# Patient Record
Sex: FEMALE | Race: BLACK OR AFRICAN AMERICAN | URBAN - METROPOLITAN AREA
[De-identification: names, ages, dates, MRNs, and addresses within clinical notes are randomized per-mention and may not be internally consistent; named-entity substitution may affect disease eponyms.]

---

## 2017-02-10 ENCOUNTER — OFFICE VISIT (OUTPATIENT)
Dept: SURGERY | Facility: CLINIC | Age: 67
End: 2017-02-10
Payer: COMMERCIAL

## 2017-02-10 VITALS
WEIGHT: 231 LBS | RESPIRATION RATE: 18 BRPM | TEMPERATURE: 98 F | HEART RATE: 100 BPM | BODY MASS INDEX: 39.44 KG/M2 | SYSTOLIC BLOOD PRESSURE: 135 MMHG | HEIGHT: 64 IN | OXYGEN SATURATION: 98 % | DIASTOLIC BLOOD PRESSURE: 69 MMHG

## 2017-02-10 DIAGNOSIS — Z13.21 SCREENING FOR ENDOCRINE, NUTRITIONAL, METABOLIC AND IMMUNITY DISORDER: ICD-10-CM

## 2017-02-10 DIAGNOSIS — G47.33 OSA (OBSTRUCTIVE SLEEP APNEA): ICD-10-CM

## 2017-02-10 DIAGNOSIS — Z13.228 SCREENING FOR ENDOCRINE, NUTRITIONAL, METABOLIC AND IMMUNITY DISORDER: ICD-10-CM

## 2017-02-10 DIAGNOSIS — E11.9 TYPE 2 DIABETES MELLITUS WITHOUT COMPLICATION, WITHOUT LONG-TERM CURRENT USE OF INSULIN (H): Primary | ICD-10-CM

## 2017-02-10 DIAGNOSIS — Z13.0 SCREENING FOR ENDOCRINE, NUTRITIONAL, METABOLIC AND IMMUNITY DISORDER: ICD-10-CM

## 2017-02-10 DIAGNOSIS — Z13.0 SCREENING FOR IRON DEFICIENCY ANEMIA: ICD-10-CM

## 2017-02-10 DIAGNOSIS — Z13.29 SCREENING FOR ENDOCRINE, NUTRITIONAL, METABOLIC AND IMMUNITY DISORDER: ICD-10-CM

## 2017-02-10 DIAGNOSIS — I10 ESSENTIAL HYPERTENSION: ICD-10-CM

## 2017-02-10 PROCEDURE — 97802 MEDICAL NUTRITION INDIV IN: CPT | Performed by: DIETITIAN, REGISTERED

## 2017-02-10 PROCEDURE — 99205 OFFICE O/P NEW HI 60 MIN: CPT | Performed by: PHYSICIAN ASSISTANT

## 2017-02-10 RX ORDER — ASPIRIN 325 MG
TABLET, DELAYED RELEASE (ENTERIC COATED) ORAL DAILY
COMMUNITY

## 2017-02-10 RX ORDER — ALBUTEROL SULFATE 0.83 MG/ML
1 SOLUTION RESPIRATORY (INHALATION) EVERY 6 HOURS PRN
COMMUNITY

## 2017-02-10 RX ORDER — TRIAMCINOLONE ACETONIDE 1 MG/G
CREAM TOPICAL 2 TIMES DAILY
COMMUNITY

## 2017-02-10 RX ORDER — BISMUTH SUBSALICYLATE 262MG/15ML
1 SUSPENSION, ORAL (FINAL DOSE FORM) ORAL
COMMUNITY

## 2017-02-10 NOTE — PROGRESS NOTES
Name: LINDA ARANA  : 1950  Gender: Female  MRN: [id number]  Age: 66  INITIAL BARIATRIC NUTRITION ASSESSMENT  REASON FOR VISIT:  LINDA ARANA is a 66 year old Female presents today for initial nutrition visit for bariatric surgery.  DIAGNOSIS:  Class II Obesity  ANTHROPOMETRICS:  Height: 64.1 inches  Weight: 231.0 lbs  BMI: 39.5 kg/m2  CURRENT SUPPLEMENTS:  Multivitamin/Mineral: multivitamin  WEIGHT LOSS ATTEMPTS:  Calorie Counting, Exercise, Weight Watchers, Nutri System  Prescription diet medications:  None  NUTRITION HISTORY:  Meals per day: 3  Snacking: No  Breakfast: protein, carb and dairy  Lunch: Protein and carb  Supper: protein, vegetable sometmes carb  Snacks: protein and/or carb  Drinks: coffee, tea, milk, water  Emotional eating: Yes  Binge eating: No  Night eating: Yes  Can you afford three meals per day? Yes  Can you afford the $50-60 per month for vitamins? Yes  Comments: Pt has been thinking about surgery for quite a few months; describes herself as very competitive - wants to lose 20 lbs pre-op, not 5.   DINING OUT HISTORY:  Frequency per week: Around once a week  Location: sit-down restaurants  Type of food: protein, carb, vegetable, soup, breakfast food   PHYSICAL ACTIVITY:  Type: Gym membership  Frequency: 3-4x/week  Duration (min): 60  NUTRITION DIAGNOSIS:  Patient with excessive oral food/beverage intake related to intake of calorically dense food/beverages at meals and/or snacks as evidenced by BMI of 39.5kg/m2.   INTERVENTION:  Nutrition Prescription: Recommend nutrient/ energy modification   Goals:  Begin taking multivitamin and supplements per guidelines   Eliminate snacking and be mindful of portions at mealtimes   Continue current exercise regimen   Practice  fluids and meals by 30 minuts  Implementation:  Provided written guidelines for Laparoscopic Gastric Sleeve surgery.  Provided weight loss suggestions.  Explained diet changes that would occur after  surgery.  Emphasized importance of diet and lifestyle modifications.  Discussed necessity for chewable multivitamin/ mineral supplements, calcium with vitamin D, vitamin B-12, vitamin D, Iron and vitamin C supplements.  NUTRITION MONITORING AND EVALUATION:  Verbalizes understanding of surgery diet guidelines.  Anticipated compliance: Good    FOLLOW UP: 1 month  Recommend 2 to 3 follow up visits to assist with lifestyle changes or per insurance requirements.  RD name and number provided.  TIME SPENT WITH PATIENT: 45 minutes  Deborah Herron RD, LD  Clinical Dietitian

## 2017-02-15 ENCOUNTER — TRANSFERRED RECORDS (OUTPATIENT)
Dept: HEALTH INFORMATION MANAGEMENT | Facility: CLINIC | Age: 67
End: 2017-02-15

## 2017-02-15 LAB — PHQ9 SCORE: 3

## 2017-03-10 ENCOUNTER — HOSPITAL ENCOUNTER (OUTPATIENT)
Dept: LAB | Facility: CLINIC | Age: 67
Discharge: HOME | End: 2017-03-10
Attending: PHYSICIAN ASSISTANT | Admitting: PHYSICIAN ASSISTANT
Payer: MEDICARE

## 2017-03-10 ENCOUNTER — OFFICE VISIT (OUTPATIENT)
Dept: SURGERY | Facility: CLINIC | Age: 67
End: 2017-03-10
Payer: COMMERCIAL

## 2017-03-10 DIAGNOSIS — Z13.228 SCREENING FOR ENDOCRINE, NUTRITIONAL, METABOLIC AND IMMUNITY DISORDER: ICD-10-CM

## 2017-03-10 DIAGNOSIS — Z13.21 SCREENING FOR ENDOCRINE, NUTRITIONAL, METABOLIC AND IMMUNITY DISORDER: ICD-10-CM

## 2017-03-10 DIAGNOSIS — Z13.29 SCREENING FOR ENDOCRINE, NUTRITIONAL, METABOLIC AND IMMUNITY DISORDER: ICD-10-CM

## 2017-03-10 DIAGNOSIS — Z13.0 SCREENING FOR ENDOCRINE, NUTRITIONAL, METABOLIC AND IMMUNITY DISORDER: ICD-10-CM

## 2017-03-10 DIAGNOSIS — Z13.0 SCREENING FOR IRON DEFICIENCY ANEMIA: ICD-10-CM

## 2017-03-10 LAB
ALBUMIN SERPL-MCNC: 3.9 G/DL (ref 3.4–5)
ALP SERPL-CCNC: 67 U/L (ref 40–150)
ALT SERPL W P-5'-P-CCNC: 20 U/L (ref 0–50)
ANION GAP SERPL CALCULATED.3IONS-SCNC: 4 MMOL/L (ref 3–14)
AST SERPL W P-5'-P-CCNC: 17 U/L (ref 0–45)
BILIRUB SERPL-MCNC: 0.4 MG/DL (ref 0.2–1.3)
BUN SERPL-MCNC: 26 MG/DL (ref 7–30)
CALCIUM SERPL-MCNC: 9.9 MG/DL (ref 8.5–10.1)
CHLORIDE SERPL-SCNC: 107 MMOL/L (ref 94–109)
CO2 SERPL-SCNC: 29 MMOL/L (ref 20–32)
CREAT SERPL-MCNC: 0.83 MG/DL (ref 0.52–1.04)
DEPRECATED CALCIDIOL+CALCIFEROL SERPL-MC: 49 UG/L (ref 20–75)
ERYTHROCYTE [DISTWIDTH] IN BLOOD BY AUTOMATED COUNT: 13.5 % (ref 10–15)
GFR SERPL CREATININE-BSD FRML MDRD: 68 ML/MIN/1.7M2
GLUCOSE SERPL-MCNC: 110 MG/DL (ref 70–99)
HBA1C MFR BLD: 6.5 % (ref 4.3–6)
HCT VFR BLD AUTO: 36.3 % (ref 35–47)
HGB BLD-MCNC: 12 G/DL (ref 11.7–15.7)
MCH RBC QN AUTO: 31.2 PG (ref 26.5–33)
MCHC RBC AUTO-ENTMCNC: 33.1 G/DL (ref 31.5–36.5)
MCV RBC AUTO: 94 FL (ref 78–100)
PLATELET # BLD AUTO: 209 10E9/L (ref 150–450)
POTASSIUM SERPL-SCNC: 4.4 MMOL/L (ref 3.4–5.3)
PROT SERPL-MCNC: 7.4 G/DL (ref 6.8–8.8)
RBC # BLD AUTO: 3.85 10E12/L (ref 3.8–5.2)
SODIUM SERPL-SCNC: 140 MMOL/L (ref 133–144)
WBC # BLD AUTO: 5.3 10E9/L (ref 4–11)

## 2017-03-10 PROCEDURE — 85027 COMPLETE CBC AUTOMATED: CPT | Performed by: PHYSICIAN ASSISTANT

## 2017-03-10 PROCEDURE — 80053 COMPREHEN METABOLIC PANEL: CPT | Performed by: PHYSICIAN ASSISTANT

## 2017-03-10 PROCEDURE — 97803 MED NUTRITION INDIV SUBSEQ: CPT | Performed by: DIETITIAN, REGISTERED

## 2017-03-10 PROCEDURE — 36415 COLL VENOUS BLD VENIPUNCTURE: CPT | Performed by: PHYSICIAN ASSISTANT

## 2017-03-10 PROCEDURE — 82306 VITAMIN D 25 HYDROXY: CPT | Performed by: PHYSICIAN ASSISTANT

## 2017-03-10 PROCEDURE — 83036 HEMOGLOBIN GLYCOSYLATED A1C: CPT | Performed by: PHYSICIAN ASSISTANT

## 2017-03-10 NOTE — PROGRESS NOTES
DATE OF VISIT: 3/10/2017  Name: LINDA ARANA  : 1950  Gender: Female  MRN: [id number]  Age: 67  ASSESSMENT  REASON FOR VISIT:  LINDA ARANA is a 67 year old Female presents today for a pre-surgical weight loss follow-up appointment.  DIAGNOSIS:  Class II  Obesity.    ANTHROPOMETRICS:  Height: 64.1 inches  Initial Weight: 231.0 lbs  Weight last visit: 231.0 lbs  Current Weight: 226.6 lbs  BMI: 38.8 kg/m2    NUTRITION HISTORY:  Breakfast: protein, carb and dairy  Lunch: Protein and carb  Supper: protein, vegetable sometmes carb  Snacks: Boost Glucose Control; applesauce   Drinks: coffee, decaf tea, milk, water  Consuming liquid calories: Protein supplements/shakes  Eating 3 meals per day: Yes  Eating slower: Yes  Chewing foods thoroughly: Yes  Take 30 minutes to consume each meal: Yes  Fluids and meals separate by at least 30 minutes: Yes  Comments: Pt has been thinking about surgery for quite a few months; describes herself as very competitive - wants to lose 20 lbs pre-op, not 5.  3/10/17 Patient feels her snacking is when she feels her blood glucose levels drop so eats snack. Patient has stayed focused on behavior and diet changes in the past month. Completing [facility] phone consults.  Desired Surgical Procedure: sleeve gastrectomy  PHYSICAL ACTIVITY:  Type: Gym membership ([employer]); water aerobics, circuit, cardio  Frequency: 3-4x/week  Duration (min): 60  DIAGNOSIS:  Previous Nutrition Diagnosis: Obesity related to excess energy intake as evidenced by BMI of 39.5 kg/m2-no change  Previous goals:  Begin taking multivitamin and supplements per guidelines-met  Eliminate snacking and be mindful of portions at mealtimes-met  Continue current exercise regimen-met  Practice  fluids and meals by 30 minutes-improving  Current Nutrition Diagnosis: Obesity related to excess energy intake as evidenced by BMI of 38.8 kg/m2  IMPLEMENTATION:  Nutrition Prescription: Recommended energy/nutrient  modification  Goals:  Continue to practice all prebariatric surgery diet guidelines  Take calcium supplements separately  Implementation:  - Discussed progress towards previous goals  - Reinforced importance of making behavior changes in preparation for bariatric surgery.  NUTRITION MONITORING AND EVALUATION:  Anticipated compliance: Good  Patient verbalized good understanding of bariatric diet guidelines.  Follow up: 1 month with RD  # of visits needed: 1  Cleared by RD: No  TIME SPENT WITH PATIENT: 25 minutes  Sd Aguilar, RD, LD  [facility] Outpatient Dietitian  [phone] (office phone)

## 2017-03-10 NOTE — MR AVS SNAPSHOT
MRN:[id number]                      After Visit Summary   3/10/2017    Kareem Chandler    MRN: [id number]           Visit Information        Provider Department      3/10/2017 11:30 AM 2, Keisha Gallegos RD [facility]      Your next 10 appointments already scheduled     Apr 07, 2017  9:00 AM CDT   Weight Loss Visit with [facility] 1, RD   [facility] [place] ([facility])    [address] [place] [id number] [phone]              MyChart Information     [facility] gives you secure access to your electronic health record. If you see a primary care provider, you can also send messages to your care team and make appointments. If you have questions, please call your primary care clinic.  If you do not have a primary care provider, please call [phone] and they will assist you.        Care EveryWhere ID     This is your Care EveryWhere ID. This could be used by other organizations to access your [facility] medical records  [id number]

## 2017-04-07 ENCOUNTER — OFFICE VISIT (OUTPATIENT)
Dept: SURGERY | Facility: CLINIC | Age: 67
End: 2017-04-07
Payer: COMMERCIAL

## 2017-04-07 PROCEDURE — 97803 MED NUTRITION INDIV SUBSEQ: CPT | Performed by: DIETITIAN, REGISTERED

## 2017-04-07 NOTE — PROGRESS NOTES
PRE-SURGICAL WEIGHT LOSS NUTRITION APPOINTMENT  DATE OF VISIT: 2017  Name: LINDA ARANA  : 1950  Gender: Female  MRN: [id number]  Age: 67  ASSESSMENT  REASON FOR VISIT:  LINDA ARANA is a 67 year old Female presents today for a pre-surgical weight loss follow-up appointment.  DIAGNOSIS:  Class II Obesity.    ANTHROPOMETRICS:  Height: 64.1 inches  Initial Weight: 231.0 lbs  Weight last visit: 226.6 lbs  Current Weight: 220.7 lbs  BMI: 37.8 kg/m2    NUTRITION HISTORY:  Breakfast: salmon, scrambled egg, fruit-generally within 1 hour of waking  Lunch: wrap with humus with deli meat, fruit or yogurt-4 hours after breakfast  Supper: protein, vegetable, sometimes carbohydrate-4 hours after lunch  Snacks: milk or Boost Glucose Control or popcorn-1 X per day most days  Drinks: coffee, decaf tea, milk, water  Consuming liquid calories: Protein supplements/shakes, Milk  Eating 3 meals per day: Yes  Eating slower: Yes  Chewing foods thoroughly: Yes  Take 30 minutes to consume each meal: Yes  Fluids and meals separate by at least 30 minutes: Yes  Comments: Pt has been thinking about surgery for quite a few months; describes herself as very competitive - wants to lose 20 lbs pre-op, not 5.  17: patient is pleased with her eight loss; has weaned off of steroid for asthma; working on [facility] phone calls  Desired Surgical Procedure: sleeve gastrectomy  PHYSICAL ACTIVITY:  Type: Gym membership  Frequency: 3-4x/week  Duration (min): 60  DIAGNOSIS:  Previous Nutrition Diagnosis: Obesity related to excess energy intake as evidenced by BMI of 38.8  no change, modified below  Previous goals:  Continue to practice all prebariatric surgery diet guidelines-met  Take calcium supplements separately-met  Current Nutrition Diagnosis: Obesity related to excess energy intake as evidenced by BMI of 37.8  IMPLEMENTATION:  Nutrition Prescription: Recommended energy/nutrient modification  Goals:  Eat breakfast within 1 hour  of waking  Continue to focus on getting in 7 hour of sleep per night  Continue to practice alternatives to emotional eating  Implementation:  - Discussed progress towards previous goals  - Reinforced importance of making behavior changes in preparation for bariatric surgery.  NUTRITION MONITORING AND EVALUATION:  Anticipated compliance: good  Patient verbalized good understanding of bariatric diet guidelines.  Patient has met prebariatric surgery diet requirements.  Follow up:  # of visits needed: 0  Cleared by RD: Yes  TIME SPENT WITH PATIENT: 23 minutes  Carlos Bosch RD, LD  [facility]  [phone]

## 2017-04-07 NOTE — MR AVS SNAPSHOT
MRN:[id number]                      After Visit Summary   4/7/2017    Kareem Chandler    MRN: [id number]           Visit Information        Provider Department      4/7/2017 9:00 AM 1, Keisha Gallegos RD [facility]      MyChart Information     [facility] gives you secure access to your electronic health record. If you see a primary care provider, you can also send messages to your care team and make appointments. If you have questions, please call your primary care clinic.  If you do not have a primary care provider, please call [phone] and they will assist you.        Care EveryWhere ID     This is your Care EveryWhere ID. This could be used by other organizations to access your [facility] medical records  [id number]

## 2020-03-10 ENCOUNTER — HEALTH MAINTENANCE LETTER (OUTPATIENT)
Age: 70
End: 2020-03-10

## 2020-12-27 ENCOUNTER — HEALTH MAINTENANCE LETTER (OUTPATIENT)
Age: 70
End: 2020-12-27

## 2021-04-24 ENCOUNTER — HEALTH MAINTENANCE LETTER (OUTPATIENT)
Age: 71
End: 2021-04-24

## 2021-08-14 ENCOUNTER — HEALTH MAINTENANCE LETTER (OUTPATIENT)
Age: 71
End: 2021-08-14

## 2021-10-09 ENCOUNTER — HEALTH MAINTENANCE LETTER (OUTPATIENT)
Age: 71
End: 2021-10-09

## 2022-03-26 ENCOUNTER — HEALTH MAINTENANCE LETTER (OUTPATIENT)
Age: 72
End: 2022-03-26

## 2022-05-16 ENCOUNTER — HEALTH MAINTENANCE LETTER (OUTPATIENT)
Age: 72
End: 2022-05-16

## 2022-09-11 ENCOUNTER — HEALTH MAINTENANCE LETTER (OUTPATIENT)
Age: 72
End: 2022-09-11

## 2023-01-23 ENCOUNTER — HEALTH MAINTENANCE LETTER (OUTPATIENT)
Age: 73
End: 2023-01-23

## 2023-06-03 ENCOUNTER — HEALTH MAINTENANCE LETTER (OUTPATIENT)
Age: 73
End: 2023-06-03

## 2023-07-29 ENCOUNTER — HEALTH MAINTENANCE LETTER (OUTPATIENT)
Age: 73
End: 2023-07-29

## 2024-02-24 ENCOUNTER — HEALTH MAINTENANCE LETTER (OUTPATIENT)
Age: 74
End: 2024-02-24

## 2025-03-24 NOTE — PROGRESS NOTES
[facility]  [address]  [place] [address]  Phone [phone] Fax [phone]    Date: 2/10/2017    RE: LINDA ARANA  MR#: [id number]  : 1950    Dear Dr Oliverio Stoner at [facility],     I had the pleasure of seeing your patient, LINDA ARANA, to evaluate her obesity and consider her for possible weight loss surgery. As you know, LINDA is 66 years old. She has been overweight from middle age and beyond. She has been morbidly obese for the last 3 years and has been unable to achieve long-term success with weight loss attempts, such as: Calorie Counting, Exercise, Weight Watchers, or Nutri System.  Comorbidities of obesity from her past medical history include: Asthma - takes daily meds, High blood pressure-on daily med, High cholesterol-on daily med, Diabetes type IIon -metformin, GERD, Plantar fasciitis, Osteoarthritis, Obstructive sleep apnea- has CPAP but does not use.  The symptoms related to her obesity include Post menopausal, Knee pain, Loud snoring. The following ADLs are hindered due to her weight: Not enough energy to complete routine daily tasks.    Non-Obesity Related Past Medical History:  Anemia - History of chronic anemia. Was on oral supplements but because of side effects stopped taking. Has not had a transfusion.   hyperthyroidism - History of Graves disease. Treated and now takes oral supplement.   diabetes type 2 - diagnosed roughly 6-7 years ago. Was on insulin for about 6 months now controlled by metformin alone. With lifestyle modifications A1-C has gone down from14 to roughly 6.    Past Surgical History:   x 2  Couch Teeth extracted, acl repair, Foot surgery, D&C    Family History:  Father- Diabetes;High blood pressure;High cholesterol  Mother- High blood pressure;High cholesterol  MGF- High blood pressure  MGM- Diabetes;High blood pressure;Obese  Sibling 1- High blood pressure;High cholesterol;Obese  Sibling 2- High blood  pressure;High cholesterol  Sibling 3- None  Sibling 4- None    Social History:  She has a Significant other. She is a Retired nursing supervisor.  Ethnicity:   ETOH: Socially - maybe 10 times per year will have 1 glass  Illicit Drug Use: None  Tobacco Use: No - never  Support system: sister, daughter and boyfriend will be available to help the patient in the early post op period. family and boyfriend is who the patient can count on for support throughout her weight loss journey.  Can you afford three meals per day? Yes  ?Can you afford the $50-60 per month for vitamins? Yes    A 14 point review of system shows:  Female: Postmenopausal bleeding - about 3 years ago. Was biopsied. No further problems  HEENT: Missing teeth,  Musculoskeletal: Knee pain,  Pulmonary: Snoring - Was diagnosed with CARLOS about 3-4 years at [facility]. Given CPAP but does not wear it.     Vital Signs: Ht: 64.1 in, Wt: 231.0 lbs., BMI: 39.5, BP: 135 / 69, Pulse: 100, RR: 18, Temp: 98    PHYSICAL EXAMINATION:  GENERAL: Alert and oriented x3. NAD  HEENT: Normocephalic, atraumatic, EOMI, Missing all teeth left lower side, Missing all teeth on right lower side.   CARDIOVASCULAR: Regular rate and rhythm, No murmurs, rubs or gallops  RESPIRATORY: Lung sounds clear to auscultation bilaterally  GASTROINTESTINAL: Soft, Obese, Nontender  LOWER EXTREMITIES: No edema  MUSCULOSKELETAL: Examination gait was normal  NEUROLOGIC: Alert and oriented x 3, no gross defect  SKIN: dry, warm to touch    In summary, LINDA is obese with a body mass index of 39.5 kg/m2 and the comorbidities stated above. She attended an informational seminar and is a candidate for Laparoscopic Gastric Sleeve. She will have to complete the following pre-requisites:  Received weight loss goal of 5 lb prior to surgery. (Patient has set her own goal of 20 pds weight loss)  Bariatric support group  Achieve clearance from dietitian to see surgeon.  Discussed [facility]  phone call requirement.  Get letter from sleep study clinic provider.  Have Dental Evaluation. - must have teeth on right and lower sides replaced or get a partial  Have preoperative laboratory tests drawn.  Psychological Evaluation with MMPI and clearance for weight loss surgery. - scheduled with Dr Hill     Today in the office we discussed gastric sleeve surgery. Preoperative, perioperative, and postoperative processes, management, and follow up were addressed. Risks and benefits were outlined including the risk of death, PE, DVT, ulcer, worsening GERD, N/V, stricture, hernia, wound infection, and vitamin deficiencies. All questions were answered. A goal sheet and support group handout were given to the patient.  Once the patient has completed the requirements set forth in paragraph one, and there are no further recommendations, she will be allowed to see the surgeon of her choice for consultation on the Laparoscopic Gastric Sleeve surgery. Patient verbalizes understanding of the process to surgery and expectations for the postoperative period including the need for lifelong lifestyle changes, vitamin supplementation, and laboratory monitoring.  Thank you for allowing us to participate in her care.    Sincerely,    Martha Mccullough MS, PA-C  At [facility] we are committed to providing you exceptional care. Our surgeons specialize in performing the following minimally invasive weight-loss surgeries:  Patti-en-Y gastric bypass  Laparoscopic adjustable gastric band  Sleeve gastrectomy    If you would like to review aspects of our weight loss surgery program, please visit our website at [email or web address]. If you have any questions, please do not hesitate to contact us at [phone].      Unknown